# Patient Record
Sex: MALE | Race: WHITE | URBAN - METROPOLITAN AREA
[De-identification: names, ages, dates, MRNs, and addresses within clinical notes are randomized per-mention and may not be internally consistent; named-entity substitution may affect disease eponyms.]

---

## 2022-02-09 ENCOUNTER — OFFICE VISIT (OUTPATIENT)
Dept: URBAN - METROPOLITAN AREA CLINIC 81 | Facility: CLINIC | Age: 42
End: 2022-02-09

## 2022-02-09 DIAGNOSIS — H40.013 OPEN ANGLE WITH BORDERLINE FINDINGS, LOW RISK, BILATERAL: ICD-10-CM

## 2022-02-09 DIAGNOSIS — H52.223 REGULAR ASTIGMATISM, BILATERAL: ICD-10-CM

## 2022-02-09 DIAGNOSIS — H53.19 OTHER SUBJECTIVE VISUAL DISTURBANCES: ICD-10-CM

## 2022-02-09 DIAGNOSIS — H43.393 OTHER VITREOUS OPACITIES, BILATERAL: Primary | ICD-10-CM

## 2022-02-09 PROCEDURE — 99203 OFFICE O/P NEW LOW 30 MIN: CPT | Performed by: OPTOMETRIST

## 2022-02-09 PROCEDURE — 92134 CPTRZ OPH DX IMG PST SGM RTA: CPT | Performed by: OPTOMETRIST

## 2022-02-09 ASSESSMENT — KERATOMETRY
OD: 42.00
OS: 42.13

## 2022-02-09 ASSESSMENT — INTRAOCULAR PRESSURE
OS: 15
OD: 16

## 2022-02-09 NOTE — IMPRESSION/PLAN
Impression: Open angle with borderline findings, low risk, bilateral: H40.013. Plan: Large C/D OU, observe. IOP normotensive OU. Monitor yearly.

## 2022-02-09 NOTE — IMPRESSION/PLAN
Impression: Other vitreous opacities, bilateral: H43.393. OS>OD Plan: Discussed signs and symptoms of PVD/floaters. Signs and symptoms of retinal detachment were discussed in detail. There is no evidence of retina pathology. No treatment is required at this time. Patient instructed to call immediately if any signs or symptoms of retinal detachments occur.

## 2022-02-09 NOTE — IMPRESSION/PLAN
Impression: Other subjective visual disturbances: H53.19. Left

-Left eye- afterimage any time he blinks in a well lite room, patient reports a cecocentral visual disturbance that is oval with a point horizontal, onset 2-3 weeks. -02/09/2022 order and performed OCT mac, reviewed today
-Amsler grid, normal OD/OS Plan: Discussed diagnosis with patient in detail. Will continue to observe condition and/or symptoms. Signs and symptoms of retinal detachment were discussed in detail. There is no evidence of retina pathology. No treatment is required at this time. Dispensed Amsler grid for home monitoring. Patient instructed to call immediately if any signs or symptoms of retinal detachments occur.

## 2022-02-09 NOTE — IMPRESSION/PLAN
Impression: Regular astigmatism, bilateral: H52.223. Plan: Discussed. Recommend RTC for updated refraction PRN.